# Patient Record
Sex: MALE | Race: WHITE | NOT HISPANIC OR LATINO | Employment: FULL TIME | URBAN - METROPOLITAN AREA
[De-identification: names, ages, dates, MRNs, and addresses within clinical notes are randomized per-mention and may not be internally consistent; named-entity substitution may affect disease eponyms.]

---

## 2017-01-13 ENCOUNTER — MYC MEDICAL ADVICE (OUTPATIENT)
Dept: PEDIATRICS | Facility: CLINIC | Age: 67
End: 2017-01-13

## 2017-01-16 DIAGNOSIS — E11.9 TYPE 2 DIABETES MELLITUS WITHOUT COMPLICATION (H): Primary | ICD-10-CM

## 2017-01-16 RX ORDER — TELMISARTAN 20 MG/1
20 TABLET ORAL DAILY
Qty: 90 TABLET | Refills: 2 | Status: SHIPPED | OUTPATIENT
Start: 2017-01-16 | End: 2022-08-05

## 2017-01-16 NOTE — TELEPHONE ENCOUNTER
Micardis 20 MG      Last Written Prescription Date: 9/8/16  Last Fill Quantity: 30, # refills: 3  Last Office Visit with G, P or [facility] prescribing provider: 9/1/16       POTASSIUM   Date Value Ref Range Status   09/01/2016 4.2 3.4 - 5.3 mmol/L Final     CREATININE   Date Value Ref Range Status   09/01/2016 1.21 0.66 - 1.25 mg/dL Final     BP Readings from Last 3 Encounters:   09/16/16 103/80   09/01/16 134/72   03/07/14 114/70

## 2017-01-16 NOTE — TELEPHONE ENCOUNTER
Prescription approved per [facility] Refill Protocol.  Tyra Curiel, RN  Triage Nurse

## 2017-01-16 NOTE — TELEPHONE ENCOUNTER
TELMISARTAN 20MG      Last Written Prescription Date: 9/8/2016  Last Fill Quantity: 30, # refills: 3  Last Office Visit with G, P or [facility] prescribing provider: 9/1/2016       POTASSIUM   Date Value Ref Range Status   09/01/2016 4.2 3.4 - 5.3 mmol/L Final     CREATININE   Date Value Ref Range Status   09/01/2016 1.21 0.66 - 1.25 mg/dL Final     BP Readings from Last 3 Encounters:   09/16/16 103/80   09/01/16 134/72   03/07/14 114/70

## 2017-01-17 NOTE — TELEPHONE ENCOUNTER
Appointment was advised, per chart review, patient to get kidney function tests done-already ordered.  Appointment with Dr Greer  Was due in Dec-January.  Patient was advised to schedule appointment.    Suzette Burroughs RN  Message handled by Nurse Triage.

## 2017-01-24 ENCOUNTER — MYC MEDICAL ADVICE (OUTPATIENT)
Dept: PEDIATRICS | Facility: CLINIC | Age: 67
End: 2017-01-24

## 2017-01-24 NOTE — TELEPHONE ENCOUNTER
This was filled on 1/16, patient informed.  Suzette Burroughs RN  Message handled by Nurse Triage.

## 2017-02-10 ENCOUNTER — OFFICE VISIT (OUTPATIENT)
Dept: PEDIATRICS | Facility: CLINIC | Age: 67
End: 2017-02-10
Payer: COMMERCIAL

## 2017-02-10 VITALS
TEMPERATURE: 97.5 F | HEART RATE: 71 BPM | SYSTOLIC BLOOD PRESSURE: 102 MMHG | DIASTOLIC BLOOD PRESSURE: 64 MMHG | BODY MASS INDEX: 29.19 KG/M2 | HEIGHT: 67 IN | WEIGHT: 186 LBS | OXYGEN SATURATION: 96 %

## 2017-02-10 DIAGNOSIS — R30.0 DYSURIA: ICD-10-CM

## 2017-02-10 DIAGNOSIS — Z11.59 NEED FOR HEPATITIS C SCREENING TEST: Primary | ICD-10-CM

## 2017-02-10 DIAGNOSIS — E11.9 TYPE 2 DIABETES MELLITUS WITHOUT COMPLICATION, WITHOUT LONG-TERM CURRENT USE OF INSULIN (H): ICD-10-CM

## 2017-02-10 LAB — HBA1C MFR BLD: 5.7 % (ref 4.3–6)

## 2017-02-10 PROCEDURE — 86803 HEPATITIS C AB TEST: CPT | Performed by: INTERNAL MEDICINE

## 2017-02-10 PROCEDURE — G0103 PSA SCREENING: HCPCS | Performed by: INTERNAL MEDICINE

## 2017-02-10 PROCEDURE — 83036 HEMOGLOBIN GLYCOSYLATED A1C: CPT | Performed by: INTERNAL MEDICINE

## 2017-02-10 PROCEDURE — 36415 COLL VENOUS BLD VENIPUNCTURE: CPT | Performed by: INTERNAL MEDICINE

## 2017-02-10 PROCEDURE — 80048 BASIC METABOLIC PNL TOTAL CA: CPT | Performed by: INTERNAL MEDICINE

## 2017-02-10 PROCEDURE — 82043 UR ALBUMIN QUANTITATIVE: CPT | Performed by: INTERNAL MEDICINE

## 2017-02-10 PROCEDURE — 99214 OFFICE O/P EST MOD 30 MIN: CPT | Performed by: INTERNAL MEDICINE

## 2017-02-10 RX ORDER — ATORVASTATIN CALCIUM 20 MG/1
20 TABLET, FILM COATED ORAL DAILY
Qty: 90 TABLET | Refills: 3 | Status: SHIPPED | OUTPATIENT
Start: 2017-02-10 | End: 2022-08-03

## 2017-02-10 RX ORDER — MULTIPLE VITAMINS W/ MINERALS TAB 9MG-400MCG
1 TAB ORAL DAILY
COMMUNITY

## 2017-02-10 NOTE — NURSING NOTE
"Chief Complaint   Patient presents with     RECHECK       Initial /64 mmHg  Pulse 71  Temp(Src) 97.5  F (36.4  C) (Oral)  Ht 5' 7\" (1.702 m)  Wt 186 lb (84.369 kg)  BMI 29.12 kg/m2  SpO2 96% Estimated body mass index is 29.12 kg/(m^2) as calculated from the following:    Height as of this encounter: 5' 7\" (1.702 m).    Weight as of this encounter: 186 lb (84.369 kg).  Medication Reconciliation: complete   Rayna Watts MA    "

## 2017-02-10 NOTE — PATIENT INSTRUCTIONS
1) Decrease the telmisartan down to 10 mg per day if able    2) Restart the atorvastatin- sent to [facility] for you    3) Continue the daily 81 mg aspirin    4) I will let you know labs when I get them back    5) Ok to trial off the metformin     6) Let me know if urinary issues get worse and we can consider a prostate relaxing medication    Recheck hemoglobin A1C in 6 months- in the normal range today!    Ck Greer MD

## 2017-02-10 NOTE — PROGRESS NOTES
"  SUBJECTIVE:                                                    Osman Almanzar is a 66 year old male who presents to clinic today for the following health issues:      Recheck    DM2: Has not been checking sugars. Has been on metformin 500 mg per day. On ARB and aspirin, had stopped the atorvastatin and was unsure if he should continue on this. . No vision changes. No chest pain or shortness of breath. Has been on the Co-Q-10 as well. No lower extremity edema. No foot sores or paresthesias.     Urinary frequency: notes that has issues with some dribbling after urination. No hematuria, not getting up at night for urination. Notes that if wipes after urination, seems to not be an issue.       Problem list and histories reviewed & adjusted, as indicated.  Additional history: as documented    Problem list, Medication list, Allergies, and Medical/Social/Surgical histories reviewed in EPIC and updated as appropriate.    ROS:  Constitutional, HEENT, cardiovascular, pulmonary, GI, , musculoskeletal, neuro, skin, endocrine and psych systems are negative, except as otherwise noted.    OBJECTIVE:                                                    /64 mmHg  Pulse 71  Temp(Src) 97.5  F (36.4  C) (Oral)  Ht 5' 7\" (1.702 m)  Wt 186 lb (84.369 kg)  BMI 29.12 kg/m2  SpO2 96%  Body mass index is 29.12 kg/(m^2).  GENERAL: healthy, alert and no distress  NECK: no adenopathy, no asymmetry, masses, or scars and thyroid normal to palpation  RESP: lungs clear to auscultation - no rales, rhonchi or wheezes  CV: regular rate and rhythm, normal S1 S2, no S3 or S4, no murmur, click or rub, no peripheral edema and peripheral pulses strong  ABDOMEN: soft, nontender, no hepatosplenomegaly, no masses and bowel sounds normal  MS: no gross musculoskeletal defects noted, no edema  SKIN: no suspicious lesions or rashes  NEURO: Normal strength and tone, mentation intact and speech normal  BACK: no CVA tenderness, no paralumbar " tenderness  PSYCH: mentation appears normal, affect normal/bright    Diagnostic Test Results:  Results for orders placed or performed in visit on 02/10/17   Basic metabolic panel   Result Value Ref Range    Sodium 141 133 - 144 mmol/L    Potassium 4.5 3.4 - 5.3 mmol/L    Chloride 108 94 - 109 mmol/L    Carbon Dioxide 25 20 - 32 mmol/L    Anion Gap 8 3 - 14 mmol/L    Glucose 87 70 - 99 mg/dL    Urea Nitrogen 21 7 - 30 mg/dL    Creatinine 1.36 (H) 0.66 - 1.25 mg/dL    GFR Estimate 52 (L) >60 mL/min/1.7m2    GFR Estimate If Black 63 >60 mL/min/1.7m2    Calcium 8.8 8.5 - 10.1 mg/dL   Microalbumin quantitative, random urine   Result Value Ref Range    Creatinine Urine 132 mg/dL    Albumin Urine mg/L 5 mg/L    Albumin Urine mg/g Cr 3.79 0 - 17 mg/g Cr   Hemoglobin A1c   Result Value Ref Range    Hemoglobin A1C 5.7 4.3 - 6.0 %   Hepatitis C Screen Reflex to HCV RNA Quant and Genotype   Result Value Ref Range    Hepatitis C Antibody  NR     Nonreactive   Assay performance characteristics have not been established for newborns,   infants, and children     PSA, screen   Result Value Ref Range    PSA 0.48 0 - 4 ug/L        ASSESSMENT/PLAN:                                                    1. Type 2 diabetes mellitus without complication (H)  Repeat labs today with great A1C range, will restart statin  - Basic metabolic panel  - Microalbumin quantitative, random urine  - Hemoglobin A1c  - atorvastatin (LIPITOR) 20 MG tablet; Take 1 tablet (20 mg) by mouth daily  Dispense: 90 tablet; Refill: 3    2. Need for hepatitis C screening test  - Hepatitis C Screen Reflex to HCV RNA Quant and Genotype    3. Dysuria  Deferred rectal exam per patient. Did not wish to start something at this time, discussed prostate cancer screening and would like to do the blood testing today  - PSA, screen            Patient Instructions   1) Decrease the telmisartan down to 10 mg per day if able    2) Restart the atorvastatin- sent to [facility] for you    3)  Continue the daily 81 mg aspirin    4) I will let you know labs when I get them back    5) Ok to trial off the metformin     6) Let me know if urinary issues get worse and we can consider a prostate relaxing medication    Recheck hemoglobin A1C in 6 months- in the normal range today!    MD Ck Mason MD, MD  [facility]

## 2017-02-11 LAB
ANION GAP SERPL CALCULATED.3IONS-SCNC: 8 MMOL/L (ref 3–14)
BUN SERPL-MCNC: 21 MG/DL (ref 7–30)
CALCIUM SERPL-MCNC: 8.8 MG/DL (ref 8.5–10.1)
CHLORIDE SERPL-SCNC: 108 MMOL/L (ref 94–109)
CO2 SERPL-SCNC: 25 MMOL/L (ref 20–32)
CREAT SERPL-MCNC: 1.36 MG/DL (ref 0.66–1.25)
CREAT UR-MCNC: 132 MG/DL
GFR SERPL CREATININE-BSD FRML MDRD: 52 ML/MIN/1.7M2
GLUCOSE SERPL-MCNC: 87 MG/DL (ref 70–99)
MICROALBUMIN UR-MCNC: 5 MG/L
MICROALBUMIN/CREAT UR: 3.79 MG/G CR (ref 0–17)
POTASSIUM SERPL-SCNC: 4.5 MMOL/L (ref 3.4–5.3)
PSA SERPL-ACNC: 0.48 UG/L (ref 0–4)
SODIUM SERPL-SCNC: 141 MMOL/L (ref 133–144)

## 2017-02-13 PROBLEM — N18.30 CKD (CHRONIC KIDNEY DISEASE) STAGE 3, GFR 30-59 ML/MIN (H): Status: ACTIVE | Noted: 2017-02-13

## 2017-02-13 LAB — HCV AB SERPL QL IA: NORMAL

## 2017-02-14 PROBLEM — E11.9 TYPE 2 DIABETES MELLITUS WITHOUT COMPLICATION, WITHOUT LONG-TERM CURRENT USE OF INSULIN (H): Status: RESOLVED | Noted: 2017-02-10 | Resolved: 2017-02-14

## 2017-06-13 ENCOUNTER — TRANSFERRED RECORDS (OUTPATIENT)
Dept: HEALTH INFORMATION MANAGEMENT | Facility: CLINIC | Age: 67
End: 2017-06-13

## 2020-02-23 ENCOUNTER — HEALTH MAINTENANCE LETTER (OUTPATIENT)
Age: 70
End: 2020-02-23

## 2020-12-06 ENCOUNTER — HEALTH MAINTENANCE LETTER (OUTPATIENT)
Age: 70
End: 2020-12-06

## 2021-04-11 ENCOUNTER — HEALTH MAINTENANCE LETTER (OUTPATIENT)
Age: 71
End: 2021-04-11

## 2021-08-01 ENCOUNTER — HEALTH MAINTENANCE LETTER (OUTPATIENT)
Age: 71
End: 2021-08-01

## 2021-09-26 ENCOUNTER — HEALTH MAINTENANCE LETTER (OUTPATIENT)
Age: 71
End: 2021-09-26

## 2022-03-12 ENCOUNTER — HEALTH MAINTENANCE LETTER (OUTPATIENT)
Age: 72
End: 2022-03-12

## 2022-05-07 ENCOUNTER — HEALTH MAINTENANCE LETTER (OUTPATIENT)
Age: 72
End: 2022-05-07

## 2022-08-03 ENCOUNTER — OFFICE VISIT (OUTPATIENT)
Dept: FAMILY MEDICINE | Facility: CLINIC | Age: 72
End: 2022-08-03
Payer: COMMERCIAL

## 2022-08-03 VITALS
HEIGHT: 68 IN | DIASTOLIC BLOOD PRESSURE: 76 MMHG | WEIGHT: 199 LBS | OXYGEN SATURATION: 95 % | HEART RATE: 72 BPM | RESPIRATION RATE: 18 BRPM | TEMPERATURE: 97.7 F | BODY MASS INDEX: 30.16 KG/M2 | SYSTOLIC BLOOD PRESSURE: 102 MMHG

## 2022-08-03 DIAGNOSIS — E78.00 PURE HYPERCHOLESTEROLEMIA: ICD-10-CM

## 2022-08-03 DIAGNOSIS — E11.9 TYPE 2 DIABETES MELLITUS WITHOUT COMPLICATION, WITHOUT LONG-TERM CURRENT USE OF INSULIN (H): ICD-10-CM

## 2022-08-03 DIAGNOSIS — Z00.00 MEDICARE ANNUAL WELLNESS VISIT, SUBSEQUENT: Primary | ICD-10-CM

## 2022-08-03 LAB
ANION GAP SERPL CALCULATED.3IONS-SCNC: 8 MMOL/L (ref 3–14)
BUN SERPL-MCNC: 18 MG/DL (ref 7–30)
CALCIUM SERPL-MCNC: 9.4 MG/DL (ref 8.5–10.1)
CHLORIDE BLD-SCNC: 107 MMOL/L (ref 94–109)
CHOLEST SERPL-MCNC: 238 MG/DL
CO2 SERPL-SCNC: 26 MMOL/L (ref 20–32)
CREAT SERPL-MCNC: 1.13 MG/DL (ref 0.66–1.25)
CREAT UR-MCNC: 202 MG/DL
FASTING STATUS PATIENT QL REPORTED: YES
GFR SERPL CREATININE-BSD FRML MDRD: 69 ML/MIN/1.73M2
GLUCOSE BLD-MCNC: 104 MG/DL (ref 70–99)
HBA1C MFR BLD: 6.1 % (ref 0–5.6)
HDLC SERPL-MCNC: 51 MG/DL
LDLC SERPL CALC-MCNC: 138 MG/DL
MICROALBUMIN UR-MCNC: 12 MG/L
MICROALBUMIN/CREAT UR: 5.94 MG/G CR (ref 0–17)
NONHDLC SERPL-MCNC: 187 MG/DL
POTASSIUM BLD-SCNC: 4.7 MMOL/L (ref 3.4–5.3)
SODIUM SERPL-SCNC: 141 MMOL/L (ref 133–144)
TRIGL SERPL-MCNC: 246 MG/DL

## 2022-08-03 PROCEDURE — 36415 COLL VENOUS BLD VENIPUNCTURE: CPT | Performed by: NURSE PRACTITIONER

## 2022-08-03 PROCEDURE — 80048 BASIC METABOLIC PNL TOTAL CA: CPT | Performed by: NURSE PRACTITIONER

## 2022-08-03 PROCEDURE — 99204 OFFICE O/P NEW MOD 45 MIN: CPT | Mod: 25 | Performed by: NURSE PRACTITIONER

## 2022-08-03 PROCEDURE — 82043 UR ALBUMIN QUANTITATIVE: CPT | Performed by: NURSE PRACTITIONER

## 2022-08-03 PROCEDURE — G0438 PPPS, INITIAL VISIT: HCPCS | Performed by: NURSE PRACTITIONER

## 2022-08-03 PROCEDURE — 80061 LIPID PANEL: CPT | Performed by: NURSE PRACTITIONER

## 2022-08-03 PROCEDURE — 83036 HEMOGLOBIN GLYCOSYLATED A1C: CPT | Performed by: NURSE PRACTITIONER

## 2022-08-03 ASSESSMENT — ENCOUNTER SYMPTOMS
HEARTBURN: 0
JOINT SWELLING: 0
PARESTHESIAS: 0
FREQUENCY: 0
EYE PAIN: 0
ABDOMINAL PAIN: 0
HEMATURIA: 0
DIZZINESS: 0
NERVOUS/ANXIOUS: 0
DYSURIA: 0
SHORTNESS OF BREATH: 0
CHILLS: 0
HEADACHES: 0
COUGH: 0
FEVER: 0
DIARRHEA: 0
MYALGIAS: 0
WEAKNESS: 0
SORE THROAT: 0
ARTHRALGIAS: 0
HEMATOCHEZIA: 0
NAUSEA: 0
CONSTIPATION: 0
PALPITATIONS: 0

## 2022-08-03 ASSESSMENT — ACTIVITIES OF DAILY LIVING (ADL): CURRENT_FUNCTION: NO ASSISTANCE NEEDED

## 2022-08-03 ASSESSMENT — PAIN SCALES - GENERAL: PAINLEVEL: NO PAIN (0)

## 2022-08-03 NOTE — PROGRESS NOTES
"SUBJECTIVE:   Osman Almanzar is a 72 year old male who presents for Preventive Visit.      Patient has been advised of split billing requirements and indicates understanding: Yes  Are you in the first 12 months of your Medicare coverage?  No    Healthy Habits:   PHQ-2 Total Score: 0    Healthy Habits:     In general, how would you rate your overall health?  Good    Frequency of exercise:  None    Do you usually eat at least 4 servings of fruit and vegetables a day, include whole grains    & fiber and avoid regularly eating high fat or \"junk\" foods?  No    Taking medications regularly:  Yes    Medication side effects:  None    Ability to successfully perform activities of daily living:  No assistance needed    Home Safety:  No safety concerns identified    Hearing Impairment:  No hearing concerns    In the past 6 months, have you been bothered by leaking of urine?  No    In general, how would you rate your overall mental or emotional health?  Excellent      PHQ-2 Total Score: 0      Do you feel safe in your environment? Yes    Have you ever done Advance Care Planning? (For example, a Health Directive, POLST, or a discussion with a medical provider or your loved ones about your wishes): No, advance care planning information given to patient to review.  Patient plans to discuss their wishes with loved ones or provider.         Fall risk  Fallen 2 or more times in the past year?: No  Any fall with injury in the past year?: No    Cognitive Screening   1) Repeat 3 items (Leader, Season, Table)    2) Clock draw: NORMAL  3) 3 item recall: Recalls 2 objects   Results: NORMAL clock, 1-2 items recalled: COGNITIVE IMPAIRMENT LESS LIKELY    Mini-CogTM Copyright MARIS Patrick. Licensed by the author for use in [facility]; reprinted with permission (atiya@.[facility]). All rights reserved.      Do you have sleep apnea, excessive snoring or daytime drowsiness?: no    Reviewed and updated as needed this visit by clinical staff   " Tobacco  Allergies  Meds  Problems  Med Hx  Surg Hx  Fam Hx  Soc   Hx          Reviewed and updated as needed this visit by Provider   Tobacco  Allergies  Meds  Problems  Med Hx  Surg Hx  Fam Hx           Social History     Tobacco Use     Smoking status: Never Smoker     Smokeless tobacco: Never Used   Substance Use Topics     Alcohol use: No         Alcohol Use 8/3/2022   Prescreen: >3 drinks/day or >7 drinks/week? Not Applicable   Prescreen: >3 drinks/day or >7 drinks/week? -           Would like to discuss fuzziness in his feet    Current providers sharing in care for this patient include:   Patient Care Team:  Cari Pirece as PCP - General    The following health maintenance items are reviewed in Epic and correct as of today:  Health Maintenance Due   Topic Date Due     ANNUAL REVIEW OF HM ORDERS  Never done     URINALYSIS  Never done     ZOSTER IMMUNIZATION (1 of 2) Never done     AORTIC ANEURYSM SCREENING (SYSTEM ASSIGNED)  Never done     HEMOGLOBIN  09/01/2017     Pneumococcal Vaccine: 65+ Years (2 - PCV) 09/01/2017     DIABETIC FOOT EXAM  09/08/2017     EYE EXAM  10/06/2017     DTAP/TDAP/TD IMMUNIZATION (2 - Td or Tdap) 12/12/2018     COVID-19 Vaccine (3 - Booster for Moderna series) 08/12/2021     Labs reviewed in EPIC  BP Readings from Last 3 Encounters:   08/03/22 102/76   02/10/17 102/64   09/16/16 103/80    Wt Readings from Last 3 Encounters:   08/03/22 90.3 kg (199 lb)   02/10/17 84.4 kg (186 lb)   09/01/16 83.9 kg (185 lb)                  Current Outpatient Medications   Medication Sig Dispense Refill     aspirin 81 MG tablet Take 1 tablet (81 mg) by mouth daily 30 tablet      atorvastatin (LIPITOR) 20 MG tablet Take 1 tablet (20 mg) by mouth daily 90 tablet 3     Coenzyme Q10 (COQ10 PO)        multivitamin w/minerals (THERA-VIT-M) tablet Take 1 tablet by mouth daily       Allergies   Allergen Reactions     Seasonal Allergies      Hasn't been taking medications for the  "last 5-6 years.    Tries to stay active.   Works in [employer] so has been moving around often for work.   Will be moving to [place] in 2 weeks; thinks he will be there for a year.   Prior was in [place] and [place].   Hasn't had an eye exam recently.   Thinks he had the shingles vaccine.   Bilat feet can feel \"fuzzy\", chilly at night.   Denies fuzzy feeling to be numbness or tingling.   Has a hx of diabetes that has been controlled without medication for several years.   Had covid last month, mild symptoms that have fully resolved.   Doesn't recall every having HTN.           Review of Systems  Constitutional, HEENT, cardiovascular, pulmonary, gi and gu systems are negative, except as otherwise noted.    OBJECTIVE:   /76 (BP Location: Right arm, Patient Position: Sitting, Cuff Size: Adult Large)   Pulse 72   Temp 97.7  F (36.5  C) (Oral)   Resp 18   Ht 1.727 m (5' 8\")   Wt 90.3 kg (199 lb)   SpO2 95%   BMI 30.26 kg/m   Estimated body mass index is 30.26 kg/m  as calculated from the following:    Height as of this encounter: 1.727 m (5' 8\").    Weight as of this encounter: 90.3 kg (199 lb).  Physical Exam  GENERAL: healthy, alert and no distress  EYES: Eyes grossly normal to inspection, PERRL and conjunctivae and sclerae normal  HENT: ear canals and TM's normal, nose and mouth without ulcers or lesions  NECK: no adenopathy, no asymmetry, masses, or scars and thyroid normal to palpation  RESP: lungs clear to auscultation - no rales, rhonchi or wheezes  CV: regular rate and rhythm, normal S1 S2, no S3 or S4, no murmur, click or rub, no peripheral edema and peripheral pulses strong  ABDOMEN: soft, nontender, no hepatosplenomegaly, no masses and bowel sounds normal  MS: no gross musculoskeletal defects noted, no edema  SKIN: no suspicious lesions or rashes  NEURO: Normal strength and tone, mentation intact and speech normal  PSYCH: mentation appears normal, affect normal/bright  Diabetic foot exam: normal " "DP and PT pulses, no trophic changes or ulcerative lesions, normal sensory exam, normal monofilament exam, onychomycosis on L great toe     Diagnostic Test Results:  Labs reviewed in Epic    ASSESSMENT / PLAN:   1. Type 2 diabetes mellitus without complication, without long-term current use of insulin (H)  Controlled with diet and exercise.  Hasn't taken metformin in 5-6 years.  He prefers not to take meds; okay to remain off but would recommend follow-up in 6 mos with local provider for recheck.   - HEMOGLOBIN A1C; Future  - Lipid panel reflex to direct LDL Non-fasting; Future  - BASIC METABOLIC PANEL; Future  - Albumin Random Urine Quantitative with Creat Ratio; Future  - HEMOGLOBIN A1C  - Lipid panel reflex to direct LDL Non-fasting  - BASIC METABOLIC PANEL  - Albumin Random Urine Quantitative with Creat Ratio    2. Medicare annual wellness visit, subsequent  Reviewed age appropriate screenings and immunizations.  Declines immunizations.     3. Pure hypercholesterolemia  Has been off medication.  Fasting cholesterol elevated with increased risk.  Restarting.    - atorvastatin (LIPITOR) 20 MG tablet; Take 1 tablet (20 mg) by mouth daily  Dispense: 90 tablet; Refill: 3        COUNSELING:  Reviewed preventive health counseling, as reflected in patient instructions       Consider AAA screening for ages 65-75 and smoking history       Regular exercise       Healthy diet/nutrition       Dental care       Bladder control       Colon cancer screening       Prostate cancer screening    Estimated body mass index is 30.26 kg/m  as calculated from the following:    Height as of this encounter: 1.727 m (5' 8\").    Weight as of this encounter: 90.3 kg (199 lb).    Weight management plan: Discussed healthy diet and exercise guidelines    He reports that he has never smoked. He has never used smokeless tobacco.      Appropriate preventive services were discussed with this patient, including applicable screening as appropriate " for cardiovascular disease, diabetes, osteopenia/osteoporosis, and glaucoma.  As appropriate for age/gender, discussed screening for colorectal cancer, prostate cancer, breast cancer, and cervical cancer. Checklist reviewing preventive services available has been given to the patient.    Reviewed patients plan of care and provided an AVS. The Basic Care Plan (routine screening as documented in Health Maintenance) for Osman meets the Care Plan requirement. This Care Plan has been established and reviewed with the Patient.    Counseling Resources:  ATP IV Guidelines  Pooled Cohorts Equation Calculator  Breast Cancer Risk Calculator  Breast Cancer: Medication to Reduce Risk  FRAX Risk Assessment  ICSI Preventive Guidelines  Dietary Guidelines for Americans, 2010  USDA's MyPlate  ASA Prophylaxis  Lung CA Screening    TIFFANY Rios [facility]    Identified Health Risks:

## 2022-08-05 PROBLEM — N18.30 CKD (CHRONIC KIDNEY DISEASE) STAGE 3, GFR 30-59 ML/MIN (H): Status: RESOLVED | Noted: 2017-02-13 | Resolved: 2022-08-05

## 2022-08-05 RX ORDER — ATORVASTATIN CALCIUM 20 MG/1
20 TABLET, FILM COATED ORAL DAILY
Qty: 90 TABLET | Refills: 3 | Status: SHIPPED | OUTPATIENT
Start: 2022-08-05 | End: 2023-09-22

## 2022-09-23 ENCOUNTER — IMMUNIZATION (OUTPATIENT)
Dept: FAMILY MEDICINE | Facility: CLINIC | Age: 72
End: 2022-09-23
Payer: COMMERCIAL

## 2022-09-23 DIAGNOSIS — Z23 NEED FOR PROPHYLACTIC VACCINATION AND INOCULATION AGAINST INFLUENZA: Primary | ICD-10-CM

## 2022-09-23 PROCEDURE — 90662 IIV NO PRSV INCREASED AG IM: CPT

## 2022-09-23 PROCEDURE — 99207 PR NO CHARGE NURSE ONLY: CPT

## 2022-09-23 PROCEDURE — G0008 ADMIN INFLUENZA VIRUS VAC: HCPCS

## 2022-12-24 NOTE — MR AVS SNAPSHOT
After Visit Summary   2/10/2017    Osman Almanzar    MRN: [id number]           Patient Information     Date Of Birth          1950        Visit Information        Provider Department      2/10/2017 1:30 PM Ck Greer MD [facility]        Today's Diagnoses     Need for hepatitis C screening test    -  1     Type 2 diabetes mellitus without complication (H)         Dysuria         Type 2 diabetes mellitus without complication, without long-term current use of insulin (H)           Care Instructions    1) Decrease the telmisartan down to 10 mg per day if able    2) Restart the atorvastatin- sent to [facility] for you    3) Continue the daily 81 mg aspirin    4) I will let you know labs when I get them back    5) Ok to trial off the metformin     6) Let me know if urinary issues get worse and we can consider a prostate relaxing medication    Recheck hemoglobin A1C in 6 months- in the normal range today!    Ck Greer MD        Follow-ups after your visit        Who to contact     If you have questions or need follow up information about today's clinic visit or your schedule please contact [facility] directly at [phone].  Normal or non-critical lab and imaging results will be communicated to you by gopogohart, letter or phone within 4 business days after the clinic has received the results. If you do not hear from us within 7 days, please contact the clinic through [employer] or phone. If you have a critical or abnormal lab result, we will notify you by phone as soon as possible.  Submit refill requests through [employer] or call your pharmacy and they will forward the refill request to us. Please allow 3 business days for your refill to be completed.          Additional Information About Your Visit        gopogohart Information     [employer] gives you secure access to your electronic health record. If you see a primary care provider, you can also send messages to your care team and  "make appointments. If you have questions, please call your primary care clinic.  If you do not have a primary care provider, please call [phone] and they will assist you.        Care EveryWhere ID     This is your Care EveryWhere ID. This could be used by other organizations to access your [facility] medical records  [id number]        Your Vitals Were     Pulse Temperature Height BMI (Body Mass Index) Pulse Oximetry       71 97.5  F (36.4  C) (Oral) 5' 7\" (1.702 m) 29.12 kg/m2 96%        Blood Pressure from Last 3 Encounters:   02/10/17 102/64   09/16/16 103/80   09/01/16 134/72    Weight from Last 3 Encounters:   02/10/17 186 lb (84.369 kg)   09/01/16 185 lb (83.915 kg)   07/21/15 169 lb 3.2 oz (76.749 kg)              We Performed the Following     Basic metabolic panel     Hemoglobin A1c     Hepatitis C Screen Reflex to HCV RNA Quant and Genotype     Microalbumin quantitative, random urine     PSA, screen          Today's Medication Changes          These changes are accurate as of: 2/10/17  2:00 PM.  If you have any questions, ask your nurse or doctor.               Stop taking these medicines if you haven't already. Please contact your care team if you have questions.     ketoconazole 2 % cream   Commonly known as:  NIZORAL   Stopped by:  Ck Greer MD           NO ACTIVE MEDICATIONS   Stopped by:  Ck Greer MD           TIZANIDINE HCL PO   Stopped by:  Ck Greer MD                Where to get your medicines      These medications were sent to [facility] [address] [place] [id number] [facility]  [address] [facility] [address]     Phone:  [phone]    - atorvastatin 20 MG tablet             Primary Care Provider Office Phone # Fax #    Ck Greer [phone]       [facility] [address] [place] [address]        Thank you!     Thank you for choosing [facility]  for your care. Our goal is always to " provide you with excellent care. Hearing back from our patients is one way we can continue to improve our services. Please take a few minutes to complete the written survey that you may receive in the mail after your visit with us. Thank you!             Your Updated Medication List - Protect others around you: Learn how to safely use, store and throw away your medicines at www.disposemymeds.org.          This list is accurate as of: 2/10/17  2:00 PM.  Always use your most recent med list.                   Brand Name Dispense Instructions for use    aspirin 81 MG tablet     30 tablet    Take 1 tablet (81 mg) by mouth daily       atorvastatin 20 MG tablet    LIPITOR    90 tablet    Take 1 tablet (20 mg) by mouth daily       COQ10 PO          lisinopril 2.5 MG tablet    PRINIVIL/Zestril    90 tablet    Take 1 tablet (2.5 mg) by mouth daily       metFORMIN 500 MG tablet    GLUCOPHAGE    30 tablet    Take 1 tablet (500 mg) by mouth daily (with dinner)       Multi-vitamin Tabs tablet      Take 1 tablet by mouth daily       telmisartan 20 MG Tabs tablet    MICARDIS    90 tablet    Take 1 tablet (20 mg) by mouth daily          Initial (On Arrival)

## 2023-04-23 ENCOUNTER — HEALTH MAINTENANCE LETTER (OUTPATIENT)
Age: 73
End: 2023-04-23

## 2023-07-05 ENCOUNTER — PATIENT OUTREACH (OUTPATIENT)
Dept: CARE COORDINATION | Facility: CLINIC | Age: 73
End: 2023-07-05
Payer: COMMERCIAL

## 2023-07-19 ENCOUNTER — PATIENT OUTREACH (OUTPATIENT)
Dept: CARE COORDINATION | Facility: CLINIC | Age: 73
End: 2023-07-19
Payer: COMMERCIAL

## 2023-09-23 ENCOUNTER — HEALTH MAINTENANCE LETTER (OUTPATIENT)
Age: 73
End: 2023-09-23

## 2023-12-02 ENCOUNTER — HEALTH MAINTENANCE LETTER (OUTPATIENT)
Age: 73
End: 2023-12-02

## 2024-06-29 ENCOUNTER — HEALTH MAINTENANCE LETTER (OUTPATIENT)
Age: 74
End: 2024-06-29

## 2024-09-04 ENCOUNTER — OFFICE VISIT (OUTPATIENT)
Dept: FAMILY MEDICINE | Facility: CLINIC | Age: 74
End: 2024-09-04
Payer: COMMERCIAL

## 2024-09-04 VITALS
DIASTOLIC BLOOD PRESSURE: 83 MMHG | OXYGEN SATURATION: 96 % | BODY MASS INDEX: 29.86 KG/M2 | TEMPERATURE: 98.5 F | RESPIRATION RATE: 16 BRPM | SYSTOLIC BLOOD PRESSURE: 134 MMHG | WEIGHT: 197 LBS | HEIGHT: 68 IN | HEART RATE: 63 BPM

## 2024-09-04 DIAGNOSIS — Z29.11 NEED FOR VACCINATION AGAINST RESPIRATORY SYNCYTIAL VIRUS: ICD-10-CM

## 2024-09-04 DIAGNOSIS — Z00.00 ENCOUNTER FOR MEDICARE ANNUAL WELLNESS EXAM: Primary | ICD-10-CM

## 2024-09-04 DIAGNOSIS — R73.03 PREDIABETES: ICD-10-CM

## 2024-09-04 DIAGNOSIS — Z23 NEED FOR TDAP VACCINATION: ICD-10-CM

## 2024-09-04 DIAGNOSIS — Z13.220 SCREENING FOR LIPOID DISORDERS: ICD-10-CM

## 2024-09-04 DIAGNOSIS — Z13.1 SCREENING FOR DIABETES MELLITUS: ICD-10-CM

## 2024-09-04 DIAGNOSIS — E78.5 HYPERLIPIDEMIA LDL GOAL <160: ICD-10-CM

## 2024-09-04 LAB — HBA1C MFR BLD: 6.4 % (ref 0–5.6)

## 2024-09-04 PROCEDURE — G0008 ADMIN INFLUENZA VIRUS VAC: HCPCS | Performed by: FAMILY MEDICINE

## 2024-09-04 PROCEDURE — 90662 IIV NO PRSV INCREASED AG IM: CPT | Performed by: FAMILY MEDICINE

## 2024-09-04 PROCEDURE — 80048 BASIC METABOLIC PNL TOTAL CA: CPT | Performed by: FAMILY MEDICINE

## 2024-09-04 PROCEDURE — 80061 LIPID PANEL: CPT | Performed by: FAMILY MEDICINE

## 2024-09-04 PROCEDURE — 36415 COLL VENOUS BLD VENIPUNCTURE: CPT | Performed by: FAMILY MEDICINE

## 2024-09-04 PROCEDURE — 83036 HEMOGLOBIN GLYCOSYLATED A1C: CPT | Performed by: FAMILY MEDICINE

## 2024-09-04 PROCEDURE — G0439 PPPS, SUBSEQ VISIT: HCPCS | Performed by: FAMILY MEDICINE

## 2024-09-04 ASSESSMENT — PAIN SCALES - GENERAL: PAINLEVEL: NO PAIN (0)

## 2024-09-04 NOTE — PROGRESS NOTES
"Preventive Care Visit  [facility]  DIAMANTE POLLARD MD, Family Medicine  Sep 4, 2024      Assessment & Plan     Encounter for Medicare annual wellness exam       Need for Tdap vaccination     - Tdap, tetanus-diptheria-acell pertussis, (BOOSTRIX) 5-2.5-18.5 LF-MCG/0.5 TATIANA injection; Inject 0.5 mLs into the muscle once for 1 dose.       Screening for lipoid disorders     - Lipid panel reflex to direct LDL Non-fasting; Future  - Lipid panel reflex to direct LDL Non-fasting    Screening for diabetes mellitus     - HEMOGLOBIN A1C; Future  - BASIC METABOLIC PANEL; Future  - HEMOGLOBIN A1C  - BASIC METABOLIC PANEL    Hyperlipidemia LDL goal <160     - HEMOGLOBIN A1C; Future  - BASIC METABOLIC PANEL; Future  - Lipid panel reflex to direct LDL Non-fasting; Future  - HEMOGLOBIN A1C  - BASIC METABOLIC PANEL  - Lipid panel reflex to direct LDL Non-fasting    Prediabetes     - HEMOGLOBIN A1C; Future  - BASIC METABOLIC PANEL; Future  - Lipid panel reflex to direct LDL Non-fasting; Future  - HEMOGLOBIN A1C  - BASIC METABOLIC PANEL  - Lipid panel reflex to direct LDL Non-fasting            BMI  Estimated body mass index is 29.95 kg/m  as calculated from the following:    Height as of this encounter: 1.727 m (5' 8\").    Weight as of this encounter: 89.4 kg (197 lb).   Weight management plan: Discussed healthy diet and exercise guidelines    Counseling  Appropriate preventive services were addressed with this patient via screening, questionnaire, or discussion as appropriate for fall prevention, nutrition, physical activity, Tobacco-use cessation, social engagement, weight loss and cognition.  Checklist reviewing preventive services available has been given to the patient.  Reviewed patient's diet, addressing concerns and/or questions.   He is at risk for lack of exercise and has been provided with information to increase physical activity for the benefit of his well-being.           Glenroy Brewer is a " 74 year old, presenting for the following:  Annual Visit (Awv )        9/4/2024     1:47 PM   Additional Questions   Roomed by kulwinder hui cma         Health Care Directive  Patient does not have a Health Care Directive or Living Will: Discussed advance care planning with patient; information given to patient to review.    HPI              9/4/2024   General Health   How would you rate your overall physical health? Good   Feel stress (tense, anxious, or unable to sleep) Not at all            9/4/2024   Nutrition   Diet: Regular (no restrictions)            9/4/2024   Exercise   Days per week of moderate/strenous exercise 3 days   Average minutes spent exercising at this level 60 min            9/4/2024   Social Factors   Frequency of gathering with friends or relatives Patient declined   Worry food won't last until get money to buy more No   Food not last or not have enough money for food? No   Do you have housing? (Housing is defined as stable permanent housing and does not include staying ouside in a car, in a tent, in an abandoned building, in an overnight shelter, or couch-surfing.) Yes   Are you worried about losing your housing? No   Lack of transportation? No   Unable to get utilities (heat,electricity)? No            9/4/2024   Fall Risk   Fallen 2 or more times in the past year? No    No   Trouble with walking or balance? No    No       Multiple values from one day are sorted in reverse-chronological order          9/4/2024   Activities of Daily Living- Home Safety   Needs help with the following daily activites None of the above   Safety concerns in the home None of the above            9/4/2024   Dental   Dentist two times every year? Yes            9/4/2024   Hearing Screening   Hearing concerns? None of the above            9/4/2024   Driving Risk Screening   Patient/family members have concerns about driving No            9/4/2024   General Alertness/Fatigue Screening   Have you been more tired than usual  lately? No            9/4/2024   Urinary Incontinence Screening   Bothered by leaking urine in past 6 months No            9/4/2024   TB Screening   Were you born outside of the [place]? No            Today's PHQ-2 Score:       9/4/2024     9:47 AM   PHQ-2 ( 1999 Pf[employer])   Q1: Little interest or pleasure in doing things 0   Q2: Feeling down, depressed or hopeless 0   PHQ-2 Score 0   Q1: Little interest or pleasure in doing things Not at all   Q2: Feeling down, depressed or hopeless Not at all   PHQ-2 Score 0           9/4/2024   Substance Use   Alcohol more than 3/day or more than 7/wk Not Applicable   Do you have a current opioid prescription? No   How severe/bad is pain from 1 to 10? 0/10 (No Pain)   Do you use any other substances recreationally? No        Social History     Tobacco Use    Smoking status: Never    Smokeless tobacco: Never   Vaping Use    Vaping status: Never Used   Substance Use Topics    Alcohol use: No    Drug use: No           9/4/2024   AAA Screening   Family history of Abdominal Aortic Aneurysm (AAA)? No      ASCVD Risk   The 10-year ASCVD risk score (Jackelyn STOCKTON, et al., 2019) is: 42.5%    Values used to calculate the score:      Age: 74 years      Sex: Male      Is Non- : No      Diabetic: Yes      Tobacco smoker: No      Systolic Blood Pressure: 134 mmHg      Is BP treated: No      HDL Cholesterol: 51 mg/dL      Total Cholesterol: 238 mg/dL            Reviewed and updated as needed this visit by Provider                      Current providers sharing in care for this patient include:  Patient Care Team:  Julee Cortes APRN CNP as PCP - General (Family Medicine)  Julee Cortes APRN CNP as Assigned PCP    The following health maintenance items are reviewed in Epic and correct as of today:  Health Maintenance   Topic Date Due    ANNUAL REVIEW OF HM ORDERS  Never done    ZOSTER IMMUNIZATION (1 of 2) Never done    RSV VACCINE (1 - 1-dose 60+ series)  "Never done    Pneumococcal Vaccine: 65+ Years (2 of 2 - PCV) 09/01/2017    EYE EXAM  10/06/2017    DTAP/TDAP/TD IMMUNIZATION (2 - Td or Tdap) 12/12/2018    A1C  02/03/2023    MEDICARE ANNUAL WELLNESS VISIT  08/03/2023    BMP  08/03/2023    LIPID  08/03/2023    MICROALBUMIN  08/03/2023    DIABETIC FOOT EXAM  08/03/2023    INFLUENZA VACCINE (1) 09/01/2024    COVID-19 Vaccine (3 - 2023-24 season) 09/01/2024    FALL RISK ASSESSMENT  09/04/2025    COLORECTAL CANCER SCREENING  09/16/2026    ADVANCE CARE PLANNING  08/05/2027    HEPATITIS C SCREENING  Completed    PHQ-2 (once per calendar year)  Completed    HPV IMMUNIZATION  Aged Out    MENINGITIS IMMUNIZATION  Aged Out    RSV MONOCLONAL ANTIBODY  Aged Out         Review of Systems  Constitutional, HEENT, cardiovascular, pulmonary, gi and gu systems are negative, except as otherwise noted.     Objective    Exam  /83 (BP Location: Right arm, Patient Position: Sitting, Cuff Size: Adult Regular)   Pulse 63   Temp 98.5  F (36.9  C)   Resp 16   Ht 1.727 m (5' 8\")   Wt 89.4 kg (197 lb)   SpO2 96%   BMI 29.95 kg/m     Estimated body mass index is 29.95 kg/m  as calculated from the following:    Height as of this encounter: 1.727 m (5' 8\").    Weight as of this encounter: 89.4 kg (197 lb).    Physical Exam  GENERAL: alert and no distress  NECK: no adenopathy, no asymmetry, masses, or scars  RESP: lungs clear to auscultation - no rales, rhonchi or wheezes  CV: regular rate and rhythm, normal S1 S2, no S3 or S4, no murmur, click or rub, no peripheral edema  ABDOMEN: soft, nontender, no hepatosplenomegaly, no masses and bowel sounds normal  MS: no gross musculoskeletal defects noted, no edema        9/4/2024   Mini Cog   Clock Draw Score 2 Normal   3 Item Recall 2 objects recalled   Mini Cog Total Score 4                 Signed Electronically by: DIAMANTE POLLARD MD    "

## 2024-09-04 NOTE — PATIENT INSTRUCTIONS
Patient Education   Preventive Care Advice   This is general advice given by our system to help you stay healthy. However, your care team may have specific advice just for you. Please talk to your care team about your preventive care needs.  Nutrition  Eat 5 or more servings of fruits and vegetables each day.  Try wheat bread, brown rice and whole grain pasta (instead of white bread, rice, and pasta).  Get enough calcium and vitamin D. Check the label on foods and aim for 100% of the RDA (recommended daily allowance).  Lifestyle  Exercise at least 150 minutes each week  (30 minutes a day, 5 days a week).  Do muscle strengthening activities 2 days a week. These help control your weight and prevent disease.  No smoking.  Wear sunscreen to prevent skin cancer.  Have a dental exam and cleaning every 6 months.  Yearly exams  See your health care team every year to talk about:  Any changes in your health.  Any medicines your care team has prescribed.  Preventive care, family planning, and ways to prevent chronic diseases.  Shots (vaccines)   HPV shots (up to age 26), if you've never had them before.  Hepatitis B shots (up to age 59), if you've never had them before.  COVID-19 shot: Get this shot when it's due.  Flu shot: Get a flu shot every year.  Tetanus shot: Get a tetanus shot every 10 years.  Pneumococcal, hepatitis A, and RSV shots: Ask your care team if you need these based on your risk.  Shingles shot (for age 50 and up)  General health tests  Diabetes screening:  Starting at age 35, Get screened for diabetes at least every 3 years.  If you are younger than age 35, ask your care team if you should be screened for diabetes.  Cholesterol test: At age 39, start having a cholesterol test every 5 years, or more often if advised.  Bone density scan (DEXA): At age 50, ask your care team if you should have this scan for osteoporosis (brittle bones).  Hepatitis C: Get tested at least once in your life.  STIs (sexually  transmitted infections)  Before age 24: Ask your care team if you should be screened for STIs.  After age 24: Get screened for STIs if you're at risk. You are at risk for STIs (including HIV) if:  You are sexually active with more than one person.  You don't use condoms every time.  You or a partner was diagnosed with a sexually transmitted infection.  If you are at risk for HIV, ask about PrEP medicine to prevent HIV.  Get tested for HIV at least once in your life, whether you are at risk for HIV or not.  Cancer screening tests  Cervical cancer screening: If you have a cervix, begin getting regular cervical cancer screening tests starting at age 21.  Breast cancer scan (mammogram): If you've ever had breasts, begin having regular mammograms starting at age 40. This is a scan to check for breast cancer.  Colon cancer screening: It is important to start screening for colon cancer at age 45.  Have a colonoscopy test every 10 years (or more often if you're at risk) Or, ask your provider about stool tests like a FIT test every year or Cologuard test every 3 years.  To learn more about your testing options, visit:   .  For help making a decision, visit:   https://bit.ly/tj75606.  Prostate cancer screening test: If you have a prostate, ask your care team if a prostate cancer screening test (PSA) at age 55 is right for you.  Lung cancer screening: If you are a current or former smoker ages 50 to 80, ask your care team if ongoing lung cancer screenings are right for you.  For informational purposes only. Not to replace the advice of your health care provider. Copyright   2023 [employer]. All rights reserved. Clinically reviewed by the [facility]. [employer] [id number] - REV 01/24.

## 2024-09-05 LAB
ANION GAP SERPL CALCULATED.3IONS-SCNC: 10 MMOL/L (ref 7–15)
BUN SERPL-MCNC: 18.9 MG/DL (ref 8–23)
CALCIUM SERPL-MCNC: 9 MG/DL (ref 8.8–10.4)
CHLORIDE SERPL-SCNC: 104 MMOL/L (ref 98–107)
CHOLEST SERPL-MCNC: 248 MG/DL
CREAT SERPL-MCNC: 1.35 MG/DL (ref 0.67–1.17)
EGFRCR SERPLBLD CKD-EPI 2021: 55 ML/MIN/1.73M2
FASTING STATUS PATIENT QL REPORTED: NO
FASTING STATUS PATIENT QL REPORTED: NO
GLUCOSE SERPL-MCNC: 97 MG/DL (ref 70–99)
HCO3 SERPL-SCNC: 26 MMOL/L (ref 22–29)
HDLC SERPL-MCNC: 43 MG/DL
LDLC SERPL CALC-MCNC: 159 MG/DL
NONHDLC SERPL-MCNC: 205 MG/DL
POTASSIUM SERPL-SCNC: 4.5 MMOL/L (ref 3.4–5.3)
SODIUM SERPL-SCNC: 140 MMOL/L (ref 135–145)
TRIGL SERPL-MCNC: 228 MG/DL

## 2024-10-11 ENCOUNTER — TELEPHONE (OUTPATIENT)
Dept: FAMILY MEDICINE | Facility: CLINIC | Age: 74
End: 2024-10-11

## 2024-10-11 NOTE — TELEPHONE ENCOUNTER
Patient Quality Outreach    Patient is due for the following:   Diabetes -  Eye Exam, Microalbumin, and Foot Exam      Topic Date Due    Zoster (Shingles) Vaccine (1 of 2) Never done    Pneumococcal Vaccine (2 of 2 - PCV) 09/01/2017    Diptheria Tetanus Pertussis (DTAP/TDAP/TD) Vaccine (2 - Td or Tdap) 12/12/2018    COVID-19 Vaccine (3 - 2024-25 season) 09/01/2024       Next Steps:   Schedule a office visit for Diabetic check.    Type of outreach:    Sent [employer] message.    Next Steps:  Reach out within 90 days via Phone.    Max number of attempts reached: No. Will try again in 90 days if patient still on fail list.    Questions for provider review:    None           Ana Rosa Erwin CMA  Chart routed to Care Team.

## 2024-11-11 NOTE — TELEPHONE ENCOUNTER
Patient Quality Outreach    Patient is due for the following:   Diabetes -  Eye Exam, Microalbumin, and Foot Exam      Topic Date Due    Zoster (Shingles) Vaccine (1 of 2) Never done    Pneumococcal Vaccine (2 of 2 - PCV) 09/01/2017    Diptheria Tetanus Pertussis (DTAP/TDAP/TD) Vaccine (2 - Td or Tdap) 12/12/2018    COVID-19 Vaccine (3 - 2024-25 season) 09/01/2024       Next Steps:   Schedule a office visit for Diabetic check and immunizations.    Type of outreach:    Phone, spoke to patient/parent. Scheduled patient for visit.    Next Steps:  Reach out within 90 days via Phone.    Max number of attempts reached: Yes. Will try again in 90 days if patient still on fail list.    Questions for provider review:    None           Ana Rosa Erwin, CMA

## 2024-12-04 ENCOUNTER — OFFICE VISIT (OUTPATIENT)
Dept: FAMILY MEDICINE | Facility: CLINIC | Age: 74
End: 2024-12-04
Payer: COMMERCIAL

## 2024-12-04 VITALS
RESPIRATION RATE: 16 BRPM | TEMPERATURE: 97.9 F | DIASTOLIC BLOOD PRESSURE: 85 MMHG | HEART RATE: 72 BPM | OXYGEN SATURATION: 98 % | HEIGHT: 68 IN | WEIGHT: 202 LBS | SYSTOLIC BLOOD PRESSURE: 138 MMHG | BODY MASS INDEX: 30.62 KG/M2

## 2024-12-04 DIAGNOSIS — E11.9 TYPE 2 DIABETES MELLITUS WITHOUT COMPLICATION, WITHOUT LONG-TERM CURRENT USE OF INSULIN (H): Primary | ICD-10-CM

## 2024-12-04 DIAGNOSIS — E78.00 PURE HYPERCHOLESTEROLEMIA: ICD-10-CM

## 2024-12-04 PROCEDURE — 99213 OFFICE O/P EST LOW 20 MIN: CPT | Performed by: FAMILY MEDICINE

## 2024-12-04 RX ORDER — OMEGA-3 FATTY ACIDS/FISH OIL 300-1000MG
CAPSULE ORAL
COMMUNITY
Start: 2019-01-01

## 2024-12-04 ASSESSMENT — PAIN SCALES - GENERAL: PAINLEVEL_OUTOF10: NO PAIN (0)

## 2024-12-04 NOTE — PROGRESS NOTES
"  Assessment & Plan     Type 2 diabetes mellitus without complication, without long-term current use of insulin (H)     - OPTOMETRY REFERRAL; Future  - Albumin Random Urine Quantitative with Creat Ratio; Future    Pure hypercholesterolemia  Pt wants to chat with his wife more about her concerns regarding statins.    Additional information provided, and pt will update us with his decision or additional questions.                   Glenroy Brewer is a 74 year old, presenting for the following health issues:  Diabetes      12/4/2024    11:31 AM   Additional Questions   Roomed by kulwinder hui cma     History of Present Illness       Reason for visit:  Follow up from earlier visit    He eats 0-1 servings of fruits and vegetables daily.He consumes 1 sweetened beverage(s) daily.He exercises with enough effort to increase his heart rate 9 or less minutes per day.  He exercises with enough effort to increase his heart rate 3 or less days per week. He is missing 2 dose(s) of medications per week.  He is not taking prescribed medications regularly due to other.                     Objective    /85 (BP Location: Right arm, Patient Position: Sitting, Cuff Size: Adult Regular)   Pulse 72   Temp 97.9  F (36.6  C)   Resp 16   Ht 1.727 m (5' 8\")   Wt 91.6 kg (202 lb)   SpO2 98%   BMI 30.71 kg/m    Body mass index is 30.71 kg/m .  Physical Exam   GENERAL: alert and no distress  NECK: no adenopathy, no asymmetry, masses, or scars  RESP: lungs clear to auscultation - no rales, rhonchi or wheezes  CV: regular rate and rhythm, normal S1 S2, no S3 or S4, no murmur, click or rub, no peripheral edema  MS: no gross musculoskeletal defects noted, no edema  Diabetic foot exam: normal DP and PT pulses, no trophic changes or ulcerative lesions, normal sensory exam, and nail exam left first toe has yellow thickened nail            Signed Electronically by: DIAMANTE POLLARD MD    "

## 2025-02-27 ENCOUNTER — TELEPHONE (OUTPATIENT)
Dept: FAMILY MEDICINE | Facility: CLINIC | Age: 75
End: 2025-02-27

## 2025-02-27 NOTE — TELEPHONE ENCOUNTER
Patient Quality Outreach    Patient is due for the following:   Diabetes -  A1C and Eye Exam      Topic Date Due    Zoster (Shingles) Vaccine (1 of 2) Never done    Pneumococcal Vaccine (2 of 2 - PCV) 09/01/2017    Diptheria Tetanus Pertussis (DTAP/TDAP/TD) Vaccine (2 - Td or Tdap) 12/12/2018    COVID-19 Vaccine (3 - 2024-25 season) 09/01/2024       Action(s) Taken:   Schedule a office visit for Establish care and diabetic check    Type of outreach:    Sent [employer] message.    Questions for provider review:    None           Ana Rosa Erwin CMA  Chart routed to Care Team.

## 2025-03-08 ENCOUNTER — HEALTH MAINTENANCE LETTER (OUTPATIENT)
Age: 75
End: 2025-03-08

## 2025-04-03 NOTE — TELEPHONE ENCOUNTER
Patient Quality Outreach    Patient is due for the following:   Diabetes -  eGFR, uACR, and Eye Exam      Topic Date Due    Zoster (Shingles) Vaccine (1 of 2) Never done    Pneumococcal Vaccine (2 of 2 - PCV) 09/01/2017    Diptheria Tetanus Pertussis (DTAP/TDAP/TD) Vaccine (2 - Td or Tdap) 12/12/2018    COVID-19 Vaccine (3 - 2024-25 season) 09/01/2024       Action(s) Taken:   Patient was scheduled for Diabetic check and est care at [place]    Type of outreach:    Phone, spoke to patient/parent.      Questions for provider review:    None         Ana Rosa Erwin, [facility]  Chart routed to None.

## 2025-04-29 ENCOUNTER — OFFICE VISIT (OUTPATIENT)
Dept: FAMILY MEDICINE | Facility: CLINIC | Age: 75
End: 2025-04-29
Payer: COMMERCIAL

## 2025-04-29 VITALS
HEART RATE: 90 BPM | BODY MASS INDEX: 32.62 KG/M2 | WEIGHT: 203 LBS | HEIGHT: 66 IN | DIASTOLIC BLOOD PRESSURE: 77 MMHG | SYSTOLIC BLOOD PRESSURE: 124 MMHG | RESPIRATION RATE: 20 BRPM | TEMPERATURE: 97.9 F | OXYGEN SATURATION: 99 %

## 2025-04-29 DIAGNOSIS — E78.00 PURE HYPERCHOLESTEROLEMIA: Primary | ICD-10-CM

## 2025-04-29 DIAGNOSIS — E11.9 TYPE 2 DIABETES MELLITUS WITHOUT COMPLICATION, WITHOUT LONG-TERM CURRENT USE OF INSULIN (H): ICD-10-CM

## 2025-04-29 LAB
CREAT UR-MCNC: 189 MG/DL
EST. AVERAGE GLUCOSE BLD GHB EST-MCNC: 148 MG/DL
HBA1C MFR BLD: 6.8 % (ref 0–5.6)
HOLD SPECIMEN: NORMAL
MICROALBUMIN UR-MCNC: <12 MG/L
MICROALBUMIN/CREAT UR: NORMAL MG/G{CREAT}

## 2025-04-29 PROCEDURE — 99213 OFFICE O/P EST LOW 20 MIN: CPT

## 2025-04-29 PROCEDURE — 3074F SYST BP LT 130 MM HG: CPT

## 2025-04-29 PROCEDURE — 3078F DIAST BP <80 MM HG: CPT

## 2025-04-29 PROCEDURE — G2211 COMPLEX E/M VISIT ADD ON: HCPCS

## 2025-04-29 PROCEDURE — 36415 COLL VENOUS BLD VENIPUNCTURE: CPT

## 2025-04-29 PROCEDURE — 82570 ASSAY OF URINE CREATININE: CPT

## 2025-04-29 PROCEDURE — 82043 UR ALBUMIN QUANTITATIVE: CPT

## 2025-04-29 PROCEDURE — 83036 HEMOGLOBIN GLYCOSYLATED A1C: CPT

## 2025-04-29 ASSESSMENT — ANXIETY QUESTIONNAIRES
GAD7 TOTAL SCORE: 0
3. WORRYING TOO MUCH ABOUT DIFFERENT THINGS: NOT AT ALL
7. FEELING AFRAID AS IF SOMETHING AWFUL MIGHT HAPPEN: NOT AT ALL
2. NOT BEING ABLE TO STOP OR CONTROL WORRYING: NOT AT ALL
GAD7 TOTAL SCORE: 0
IF YOU CHECKED OFF ANY PROBLEMS ON THIS QUESTIONNAIRE, HOW DIFFICULT HAVE THESE PROBLEMS MADE IT FOR YOU TO DO YOUR WORK, TAKE CARE OF THINGS AT HOME, OR GET ALONG WITH OTHER PEOPLE: NOT DIFFICULT AT ALL
1. FEELING NERVOUS, ANXIOUS, OR ON EDGE: NOT AT ALL
5. BEING SO RESTLESS THAT IT IS HARD TO SIT STILL: NOT AT ALL
6. BECOMING EASILY ANNOYED OR IRRITABLE: NOT AT ALL

## 2025-04-29 ASSESSMENT — PATIENT HEALTH QUESTIONNAIRE - PHQ9
SUM OF ALL RESPONSES TO PHQ QUESTIONS 1-9: 0
5. POOR APPETITE OR OVEREATING: NOT AT ALL

## 2025-04-29 NOTE — PROGRESS NOTES
"  Assessment & Plan     Type 2 diabetes mellitus without complication (H)  - Hemoglobin A1c increased from 6.4 to 6.8 over the past seven months, indicating a slight worsening but still considered controlled. No current medication for diabetes.  - Continue lifestyle changes, focusing on reducing processed foods and refined sugars. Option to start medication discussed, but patient opts to continue with lifestyle modifications.   - Follow-up in 6 months, sooner as needed.    Pure hypercholesterolemia  - Patient declines medication at this time.  - Recommend Mediterranean-style diet and increase exercise      BMI  Estimated body mass index is 32.77 kg/m  as calculated from the following:    Height as of this encounter: 1.676 m (5' 6\").    Weight as of this encounter: 92.1 kg (203 lb).     The longitudinal plan of care for the diagnosis(es)/condition(s) as documented were addressed during this visit. Due to the added complexity in care, I will continue to support Osman in the subsequent management and with ongoing continuity of care.    Subjective   Osman is a 75 year old, presenting for the following health issues:  Diabetes        4/29/2025     2:37 PM   Additional Questions   Roomed by Saritha POLLOCK     History of Present Illness       Reason for visit:  Pre diabetic    He eats 0-1 servings of fruits and vegetables daily.He consumes 1 sweetened beverage(s) daily.He exercises with enough effort to increase his heart rate 9 or less minutes per day.  He exercises with enough effort to increase his heart rate 3 or less days per week.   He is taking medications regularly.      History of Present Illness-  Osman Almanzar, a 75-year-old male, discussed his diabetes management during the visit. He mentioned that his hemoglobin A1c level was 6.4 seven months ago and has increased to 6.8, indicating a slight worsening but still considered controlled. He is not currently on any medication for diabetes and has already made lifestyle " "changes, such as cutting out soft drinks, to manage his condition. He expressed willingness to continue focusing on eating whole foods, despite the expense, and has considered further dietary adjustments.    He has a daughter living in [place] and three grandchildren in [place]. 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    Diabetes Follow-up    How often are you checking your blood sugar? Not at all  What concerns do you have today about your diabetes? None   Do you have any of these symptoms? (Select all that apply)  Feet feel weird, not numb or burning  Have you had a diabetic eye exam in the last 12 months? No        BP Readings from Last 2 Encounters:   04/29/25 124/77   12/04/24 138/85     Hemoglobin A1C (%)   Date Value   09/04/2024 6.4 (H)   08/03/2022 6.1 (H)   02/10/2017 5.7   09/01/2016 6.5 (H)     LDL Cholesterol Calculated   Date Value   09/04/2024 159 mg/dL (H)   08/03/2022 138 mg/dL (H)   09/01/2016 117 mg/dL (H)   03/07/2014 164 mg/dL (calc) (H)         How many servings of fruits and vegetables do you eat daily?  0-1  On average, how many sweetened beverages do you drink each day (Examples: soda, juice, sweet tea, etc.  Do NOT count diet or artificially sweetened beverages)?   1  How many days per week do you exercise enough to make your heart beat faster? 3 or less  How many minutes a day do you exercise enough to make your heart beat faster? 9 or less  How many days per week do you miss taking your medication? 0        Objective    /77 (BP Location: Right arm, Patient Position: Sitting, Cuff Size: Adult Regular)   Pulse 90   Temp 97.9  F (36.6  C) (Oral)   Resp 20   Ht 1.676 m (5' 6\")   Wt 92.1 kg (203 lb)   SpO2 99%   BMI 32.77 kg/m    Body mass index is 32.77 kg/m .  Physical Exam   GENERAL: alert and no distress  RESP: lungs clear to auscultation - no rales, rhonchi or wheezes  CV: regular rate and " rhythm, normal S1 S2, no S3 or S4, no murmur, click or rub, no peripheral edema            Signed Electronically by: TIFFANY Murray CNP